# Patient Record
Sex: MALE | Race: BLACK OR AFRICAN AMERICAN | NOT HISPANIC OR LATINO | URBAN - METROPOLITAN AREA
[De-identification: names, ages, dates, MRNs, and addresses within clinical notes are randomized per-mention and may not be internally consistent; named-entity substitution may affect disease eponyms.]

---

## 2018-03-27 ENCOUNTER — INPATIENT (INPATIENT)
Facility: HOSPITAL | Age: 83
LOS: 4 days | Discharge: NURSING, REHAB OR LONG-TERM CARE | End: 2018-04-01
Attending: HOSPITALIST | Admitting: HOSPITALIST
Payer: MEDICARE

## 2018-03-27 VITALS
DIASTOLIC BLOOD PRESSURE: 81 MMHG | TEMPERATURE: 99 F | HEIGHT: 65 IN | WEIGHT: 119.93 LBS | OXYGEN SATURATION: 98 % | SYSTOLIC BLOOD PRESSURE: 121 MMHG | HEART RATE: 90 BPM | RESPIRATION RATE: 16 BRPM

## 2018-03-27 PROBLEM — Z00.00 ENCOUNTER FOR PREVENTIVE HEALTH EXAMINATION: Status: ACTIVE | Noted: 2018-03-27

## 2018-03-27 LAB
ALBUMIN SERPL ELPH-MCNC: 2.3 G/DL — LOW (ref 3.3–5)
ALP SERPL-CCNC: 163 U/L — HIGH (ref 40–120)
ALT FLD-CCNC: 22 U/L — SIGNIFICANT CHANGE UP (ref 12–78)
ANION GAP SERPL CALC-SCNC: 8 MMOL/L — SIGNIFICANT CHANGE UP (ref 5–17)
ANISOCYTOSIS BLD QL: SLIGHT — SIGNIFICANT CHANGE UP
APPEARANCE UR: ABNORMAL
APTT BLD: 34.5 SEC — SIGNIFICANT CHANGE UP (ref 27.5–37.4)
AST SERPL-CCNC: 28 U/L — SIGNIFICANT CHANGE UP (ref 15–37)
BACTERIA # UR AUTO: ABNORMAL
BASOPHILS # BLD AUTO: 0 K/UL — SIGNIFICANT CHANGE UP (ref 0–0.2)
BASOPHILS NFR BLD AUTO: 0 % — SIGNIFICANT CHANGE UP (ref 0–2)
BILIRUB SERPL-MCNC: 0.4 MG/DL — SIGNIFICANT CHANGE UP (ref 0.2–1.2)
BILIRUB UR-MCNC: NEGATIVE — SIGNIFICANT CHANGE UP
BUN SERPL-MCNC: 29 MG/DL — HIGH (ref 7–23)
CALCIUM SERPL-MCNC: 8.6 MG/DL — SIGNIFICANT CHANGE UP (ref 8.5–10.1)
CHLORIDE SERPL-SCNC: 104 MMOL/L — SIGNIFICANT CHANGE UP (ref 96–108)
CK MB BLD-MCNC: 1.5 % — SIGNIFICANT CHANGE UP (ref 0–3.5)
CK MB CFR SERPL CALC: 1.1 NG/ML — SIGNIFICANT CHANGE UP (ref 0.5–3.6)
CK SERPL-CCNC: 71 U/L — SIGNIFICANT CHANGE UP (ref 26–308)
CO2 SERPL-SCNC: 28 MMOL/L — SIGNIFICANT CHANGE UP (ref 22–31)
COLOR SPEC: YELLOW — SIGNIFICANT CHANGE UP
CREAT SERPL-MCNC: 0.85 MG/DL — SIGNIFICANT CHANGE UP (ref 0.5–1.3)
DIFF PNL FLD: ABNORMAL
EOSINOPHIL # BLD AUTO: 0 K/UL — SIGNIFICANT CHANGE UP (ref 0–0.5)
EOSINOPHIL NFR BLD AUTO: 0 % — SIGNIFICANT CHANGE UP (ref 0–6)
EPI CELLS # UR: SIGNIFICANT CHANGE UP
FLUAV SPEC QL CULT: NEGATIVE — SIGNIFICANT CHANGE UP
FLUBV AG SPEC QL IA: NEGATIVE — SIGNIFICANT CHANGE UP
GLUCOSE SERPL-MCNC: 156 MG/DL — HIGH (ref 70–99)
GLUCOSE UR QL: NEGATIVE MG/DL — SIGNIFICANT CHANGE UP
HCT VFR BLD CALC: 24.5 % — LOW (ref 39–50)
HGB BLD-MCNC: 7.9 G/DL — LOW (ref 13–17)
HYPOCHROMIA BLD QL: SIGNIFICANT CHANGE UP
INR BLD: 1.41 RATIO — HIGH (ref 0.88–1.16)
KETONES UR-MCNC: NEGATIVE — SIGNIFICANT CHANGE UP
LACTATE SERPL-SCNC: 1.2 MMOL/L — SIGNIFICANT CHANGE UP (ref 0.7–2)
LEUKOCYTE ESTERASE UR-ACNC: NEGATIVE — SIGNIFICANT CHANGE UP
LYMPHOCYTES # BLD AUTO: 1.3 K/UL — SIGNIFICANT CHANGE UP (ref 1–3.3)
LYMPHOCYTES # BLD AUTO: 25 % — SIGNIFICANT CHANGE UP (ref 13–44)
MANUAL SMEAR VERIFICATION: SIGNIFICANT CHANGE UP
MCHC RBC-ENTMCNC: 22.4 PG — LOW (ref 27–34)
MCHC RBC-ENTMCNC: 32.2 GM/DL — SIGNIFICANT CHANGE UP (ref 32–36)
MCV RBC AUTO: 69.4 FL — LOW (ref 80–100)
MICROCYTES BLD QL: SIGNIFICANT CHANGE UP
MONOCYTES # BLD AUTO: 0.05 K/UL — SIGNIFICANT CHANGE UP (ref 0–0.9)
MONOCYTES NFR BLD AUTO: 1 % — LOW (ref 2–14)
NEUTROPHILS # BLD AUTO: 3.83 K/UL — SIGNIFICANT CHANGE UP (ref 1.8–7.4)
NEUTROPHILS NFR BLD AUTO: 72 % — SIGNIFICANT CHANGE UP (ref 43–77)
NEUTS BAND # BLD: 2 % — SIGNIFICANT CHANGE UP (ref 0–8)
NITRITE UR-MCNC: NEGATIVE — SIGNIFICANT CHANGE UP
NRBC # BLD: 0 /100 — SIGNIFICANT CHANGE UP (ref 0–0)
NRBC # BLD: SIGNIFICANT CHANGE UP /100 WBCS (ref 0–0)
OB PNL STL: NEGATIVE — SIGNIFICANT CHANGE UP
PH UR: 5 — SIGNIFICANT CHANGE UP (ref 5–8)
PLAT MORPH BLD: NORMAL — SIGNIFICANT CHANGE UP
PLATELET # BLD AUTO: 335 K/UL — SIGNIFICANT CHANGE UP (ref 150–400)
POLYCHROMASIA BLD QL SMEAR: SLIGHT — SIGNIFICANT CHANGE UP
POTASSIUM SERPL-MCNC: 4 MMOL/L — SIGNIFICANT CHANGE UP (ref 3.5–5.3)
POTASSIUM SERPL-SCNC: 4 MMOL/L — SIGNIFICANT CHANGE UP (ref 3.5–5.3)
PROT SERPL-MCNC: 6.6 GM/DL — SIGNIFICANT CHANGE UP (ref 6–8.3)
PROT UR-MCNC: 100 MG/DL
PROTHROM AB SERPL-ACNC: 15.5 SEC — HIGH (ref 9.8–12.7)
RBC # BLD: 3.53 M/UL — LOW (ref 4.2–5.8)
RBC # FLD: 24.1 % — HIGH (ref 10.3–14.5)
RBC BLD AUTO: ABNORMAL
RBC CASTS # UR COMP ASSIST: ABNORMAL /HPF (ref 0–4)
SODIUM SERPL-SCNC: 140 MMOL/L — SIGNIFICANT CHANGE UP (ref 135–145)
SP GR SPEC: 1.01 — SIGNIFICANT CHANGE UP (ref 1.01–1.02)
TARGETS BLD QL SMEAR: SLIGHT — SIGNIFICANT CHANGE UP
TROPONIN I SERPL-MCNC: <.015 NG/ML — SIGNIFICANT CHANGE UP (ref 0.01–0.04)
UROBILINOGEN FLD QL: 1 MG/DL
WBC # BLD: 5.18 K/UL — SIGNIFICANT CHANGE UP (ref 3.8–10.5)
WBC # FLD AUTO: 5.18 K/UL — SIGNIFICANT CHANGE UP (ref 3.8–10.5)
WBC UR QL: SIGNIFICANT CHANGE UP

## 2018-03-27 PROCEDURE — 99285 EMERGENCY DEPT VISIT HI MDM: CPT

## 2018-03-27 PROCEDURE — 71045 X-RAY EXAM CHEST 1 VIEW: CPT | Mod: 26

## 2018-03-27 PROCEDURE — 70450 CT HEAD/BRAIN W/O DYE: CPT | Mod: 26

## 2018-03-27 NOTE — ED PROVIDER NOTE - OBJECTIVE STATEMENT
Pertinent PMH/PSH/FHx/SHx and Review of Systems contained within:    89yo M w PMH of metastatic prostate ca (pt not aware) currently not on treatment, no PSH presents to ED for eval of weakness & urinary incontinence.  Pt was well at baseline, until yesterday.  Pt unable to ambulate and has been having urinary incontinence.  Denies vomiting, fever, chills, CP, SOB, diarrhea.  Pt c/o mild lower abd pain.  No head trauma, LOC.  PMD- Frieda Martinez, instructed family to bring pt to ED.    No fever/chills, No photophobia/eye pain/changes in vision, No ear pain/sore throat/dysphagia, No chest pain/palpitations, no SOB/cough/wheeze/stridor, +abdominal pain, No neck/back pain, no rash

## 2018-03-27 NOTE — ED PROVIDER NOTE - PHYSICAL EXAMINATION
Gen: Alert, NAD, pleasant M speaking in complete sentneces  Head: NC, AT, EOMI, normal lids/conjunctiva  ENT: normal hearing, patent oropharynx, MMM  Neck: supple, no tenderness/meningismus, FROM  Pulm: Bilateral clear BS, normal resp effort, no wheeze/stridor/retractions  CV: RRR, no M/R/G, +dist pulses  Abd: soft, +mild lower abd TTP, ND, +BS, no guarding/rebound tenderness  Mskel: no edema/erythema/cyanosis  Skin: no rash  Neuro: no sensory/motor deficits Gen: Alert, NAD, pleasant M speaking in complete sentneces  Head: NC, AT, EOMI, normal lids/conjunctiva  ENT: normal hearing, patent oropharynx, MMM  Neck: supple, no tenderness/meningismus, FROM  Pulm: Bilateral clear BS, normal resp effort, no wheeze/stridor/retractions  CV: RRR, no M/R/G, +dist pulses  Abd: soft, +mild lower abd TTP, ND, +BS, no guarding/rebound tenderness  Mskel: no edema/erythema/cyanosis, +rectal tone  Skin: no rash  Neuro: no sensory/motor deficits

## 2018-03-27 NOTE — ED ADULT TRIAGE NOTE - CHIEF COMPLAINT QUOTE
general weakness increasing over the last two days   with urinary incontinence and inability to ambulate

## 2018-03-28 DIAGNOSIS — C61 MALIGNANT NEOPLASM OF PROSTATE: ICD-10-CM

## 2018-03-28 DIAGNOSIS — R26.2 DIFFICULTY IN WALKING, NOT ELSEWHERE CLASSIFIED: ICD-10-CM

## 2018-03-28 DIAGNOSIS — Z65.9 PROBLEM RELATED TO UNSPECIFIED PSYCHOSOCIAL CIRCUMSTANCES: ICD-10-CM

## 2018-03-28 DIAGNOSIS — Z29.9 ENCOUNTER FOR PROPHYLACTIC MEASURES, UNSPECIFIED: ICD-10-CM

## 2018-03-28 LAB
CULTURE RESULTS: SIGNIFICANT CHANGE UP
SPECIMEN SOURCE: SIGNIFICANT CHANGE UP

## 2018-03-28 PROCEDURE — 74177 CT ABD & PELVIS W/CONTRAST: CPT | Mod: 26

## 2018-03-28 PROCEDURE — 99497 ADVNCD CARE PLAN 30 MIN: CPT

## 2018-03-28 PROCEDURE — 99223 1ST HOSP IP/OBS HIGH 75: CPT

## 2018-03-28 RX ORDER — HEPARIN SODIUM 5000 [USP'U]/ML
5000 INJECTION INTRAVENOUS; SUBCUTANEOUS EVERY 8 HOURS
Qty: 0 | Refills: 0 | Status: DISCONTINUED | OUTPATIENT
Start: 2018-03-28 | End: 2018-04-01

## 2018-03-28 RX ORDER — ACETAMINOPHEN 500 MG
650 TABLET ORAL EVERY 6 HOURS
Qty: 0 | Refills: 0 | Status: DISCONTINUED | OUTPATIENT
Start: 2018-03-28 | End: 2018-04-01

## 2018-03-28 RX ORDER — MORPHINE SULFATE 50 MG/1
2 CAPSULE, EXTENDED RELEASE ORAL EVERY 6 HOURS
Qty: 0 | Refills: 0 | Status: DISCONTINUED | OUTPATIENT
Start: 2018-03-28 | End: 2018-04-01

## 2018-03-28 RX ORDER — OXYCODONE AND ACETAMINOPHEN 5; 325 MG/1; MG/1
1 TABLET ORAL EVERY 4 HOURS
Qty: 0 | Refills: 0 | Status: DISCONTINUED | OUTPATIENT
Start: 2018-03-28 | End: 2018-04-01

## 2018-03-28 RX ORDER — SODIUM CHLORIDE 9 MG/ML
1000 INJECTION INTRAMUSCULAR; INTRAVENOUS; SUBCUTANEOUS
Qty: 0 | Refills: 0 | Status: DISCONTINUED | OUTPATIENT
Start: 2018-03-28 | End: 2018-04-01

## 2018-03-28 RX ORDER — AMLODIPINE BESYLATE 2.5 MG/1
5 TABLET ORAL DAILY
Qty: 0 | Refills: 0 | Status: DISCONTINUED | OUTPATIENT
Start: 2018-03-28 | End: 2018-04-01

## 2018-03-28 RX ADMIN — OXYCODONE AND ACETAMINOPHEN 1 TABLET(S): 5; 325 TABLET ORAL at 12:02

## 2018-03-28 RX ADMIN — OXYCODONE AND ACETAMINOPHEN 1 TABLET(S): 5; 325 TABLET ORAL at 13:45

## 2018-03-28 RX ADMIN — SODIUM CHLORIDE 100 MILLILITER(S): 9 INJECTION INTRAMUSCULAR; INTRAVENOUS; SUBCUTANEOUS at 22:56

## 2018-03-28 RX ADMIN — HEPARIN SODIUM 5000 UNIT(S): 5000 INJECTION INTRAVENOUS; SUBCUTANEOUS at 22:54

## 2018-03-28 RX ADMIN — AMLODIPINE BESYLATE 5 MILLIGRAM(S): 2.5 TABLET ORAL at 18:48

## 2018-03-28 RX ADMIN — HEPARIN SODIUM 5000 UNIT(S): 5000 INJECTION INTRAVENOUS; SUBCUTANEOUS at 05:09

## 2018-03-28 RX ADMIN — SODIUM CHLORIDE 100 MILLILITER(S): 9 INJECTION INTRAMUSCULAR; INTRAVENOUS; SUBCUTANEOUS at 05:09

## 2018-03-28 RX ADMIN — HEPARIN SODIUM 5000 UNIT(S): 5000 INJECTION INTRAVENOUS; SUBCUTANEOUS at 13:42

## 2018-03-28 NOTE — PHYSICAL THERAPY INITIAL EVALUATION ADULT - CRITERIA FOR SKILLED THERAPEUTIC INTERVENTIONS
therapy frequency/impairments found/functional limitations in following categories/risk reduction/prevention/rehab potential

## 2018-03-28 NOTE — H&P ADULT - NSHPPHYSICALEXAM_GEN_ALL_CORE
GENERAL: NAD, well-groomed, cachectic  HEAD:  Atraumatic, Normocephalic  EYES: EOMI, PERRLA, conjunctiva and sclera clear  ENMT: No tonsillar erythema, exudates, or enlargement; Moist mucous membranes, No lesions  NECK: Supple, No JVD, Normal thyroid  NERVOUS SYSTEM:  Alert & Oriented X3, Good concentration  CHEST/LUNG: Clear to ascultation bilaterally; No rales, rhonchi, wheezing, or rubs  HEART: Regular rate and rhythm; No murmurs, rubs, or gallops  ABDOMEN: Soft, Nontender, Nondistended; Bowel sounds present  EXTREMITIES: No clubbing, cyanosis, or edema  SKIN: no rashes or lesions  PSYCH: normal affect and behavior

## 2018-03-28 NOTE — PHYSICAL THERAPY INITIAL EVALUATION ADULT - MODIFIED CLINICAL TEST OF SENSORY INTEGRATION IN BALANCE TEST
Barthel Index: Feeding Score-5   Bathing Score-0   Grooming Score-5   Dressing Score-5   Bowels Score-5   Bladder Score-5   Toilet Score- 0  Transfers Score-5   Mobility Score-0   Stairs Score-0     Total Score-  30/100

## 2018-03-28 NOTE — H&P ADULT - HISTORY OF PRESENT ILLNESS
89 year old man with PMH metastatic prostate ca (*patient unaware of diagnosis-as per ED attending, family was present in ED earlier, stated they did not tell patient and don't want him to know*) presents with weakness, decreased ambulation, and increased urinary incontinence.  Pt denies Back pain, numbness, fecal incontinence.  He is AAO x 3 and states that he makes medical decisions for himself.    In the ED, imaging consistent with metastatic prostate Ca.

## 2018-03-28 NOTE — CHART NOTE - NSCHARTNOTEFT_GEN_A_CORE
89 y.o with metastatic prostate Ca presents with FTT, urinary incontinence and pain. Pt is aware that he has prostate CA but not of the metastasis and as per notes the family did not want to tell him. Palliative team is to set up a family meeting as pt should be aware of his poor prognosis. Pt is AA0x3. Unsure if he has an active urologist and has been offered chemical castration. will try to contact family.

## 2018-03-28 NOTE — PHYSICAL THERAPY INITIAL EVALUATION ADULT - TINETTI BALANCE TEST, REHAB EVAL
Sitting Balance - 0  Rises From Chair - 0 Attempts to rise - 0  Immediate Standing Balance - 0  Standing Balance -0   Nudged - 0  Eyes Closed -0   Turning 360 Deg -0   Sitting down -1   Balance Score - 1/16

## 2018-03-28 NOTE — GOALS OF CARE CONVERSATION - PERSONAL ADVANCE DIRECTIVE - TREATMENT GUIDELINE COMMENT
Met with Dr. Tyler aGrcia (son) and discussed goals of care and the options, comfort measures, and Hospice care also about code status. The son stated that his brother from [place] and sister will be coming in tomorrow and will make the decision on his care after discussing with their primary attending Dr. Patel.

## 2018-03-28 NOTE — H&P ADULT - PROBLEM SELECTOR PLAN 1
Pain control   Tylenol/Percocet/Morphine PRN  Not on treatment  Palliative care consult  *pt unaware of diagnosis

## 2018-03-28 NOTE — PHYSICAL THERAPY INITIAL EVALUATION ADULT - PLANNED THERAPY INTERVENTIONS, PT EVAL
balance training/bed mobility training/gait training/strengthening/neuromuscular re-education/transfer training

## 2018-03-28 NOTE — GOALS OF CARE CONVERSATION - PERSONAL ADVANCE DIRECTIVE - CONVERSATION DETAILS
89 year old man with PMH metastatic prostate ca (*patient unaware of diagnosis-as per ED attending, family was present in ED earlier, stated they did not tell patient and don't want him to know*) presents with weakness, decreased ambulation, and increased urinary incontinence.  Pt denies Back pain, numbness, fecal incontinence.  He is AAO x 3 and states that he makes medical decisions for himself. In the ED, imaging consistent with metastatic prostate Ca.    Met and  spoke to patient and discussed goals of care and advance care planning.  Also spoke to son Tyler Garcia and he stated that he will be coming in this evening and will discus in detail. Met with Dr. Tyler Garcia (son) and discussed goals of care and the options, comfort measures, and Hospice care also about code status. The son stated that his brother from [place] and sister will be coming in tomorrow and will make the decision on his care after discussing with their primary attending Dr. Patel.

## 2018-03-28 NOTE — H&P ADULT - PROBLEM SELECTOR PLAN 2
Tried to reach family regarding patient's not knowing his diagnosis, left message at [phone].  Discussed with patient, stated that he had no one who makes medical decisions for him and that he would like to be notified of his medical issues.  He is AAO x 3 and ethically should be made aware of his prostate ca especially given that he will likely require palliative care/Hospice care.  Cannot reach family at this time.  I would like the opportunity to discuss his case with them before informing the patient of his diagnosis and encourage them to notify him/allow us to notify him of his diagnosis.  It is ethically sound to inform him and he will logistically need to know if Hospice is involved.  Will endorse to day team if family cannot be reached tonight.    Would involve ethics committee if family still refuses to inform patient of his diagnosis as he has stated he wants to be informed of his medical care.

## 2018-03-28 NOTE — GOALS OF CARE CONVERSATION - PERSONAL ADVANCE DIRECTIVE - WHAT MATTERS MOST
Met with Dr. Tyler Garcia (son) and discussed goals of care and the options, comfort measures, and Hospice care also about code status. The son stated that his brother from [place] and sister will be coming in tomorrow and will make the decision on his care after discussing with their primary attending Dr. Patel.

## 2018-03-28 NOTE — PHYSICAL THERAPY INITIAL EVALUATION ADULT - PERTINENT HX OF CURRENT PROBLEM, REHAB EVAL
Pt admitted to [facility] secondary to weakness, declining ability to ambulate, and urinary incontinence

## 2018-03-28 NOTE — H&P ADULT - NSHPLABSRESULTS_GEN_ALL_CORE
Vital Signs Last 24 Hrs  T(C): 37.7 (27 Mar 2018 23:30), Max: 37.7 (27 Mar 2018 20:25)  T(F): 99.9 (27 Mar 2018 23:30), Max: 99.9 (27 Mar 2018 23:30)  HR: 87 (27 Mar 2018 23:30) (87 - 90)  BP: 155/83 (27 Mar 2018 23:30) (121/81 - 155/83)  BP(mean): --  RR: 22 (27 Mar 2018 23:30) (16 - 22)  SpO2: 99% (27 Mar 2018 23:30) (95% - 99%)        LABS:                        7.9    5.18  )-----------( 335      ( 27 Mar 2018 19:59 )             24.5         140  |  104  |  29<H>  ----------------------------<  156<H>  4.0   |  28  |  0.85    Ca    8.6      27 Mar 2018 19:59    TPro  6.6  /  Alb  2.3<L>  /  TBili  0.4  /  DBili  x   /  AST  28  /  ALT  22  /  AlkPhos  163<H>  0327    PT/INR - ( 27 Mar 2018 19:59 )   PT: 15.5 sec;   INR: 1.41 ratio         PTT - ( 27 Mar 2018 19:59 )  PTT:34.5 sec  Urinalysis Basic - ( 27 Mar 2018 20:26 )    Color: Yellow / Appearance: Slightly Turbid / S.015 / pH: x  Gluc: x / Ketone: Negative  / Bili: Negative / Urobili: 1 mg/dL   Blood: x / Protein: 100 mg/dL / Nitrite: Negative   Leuk Esterase: Negative / RBC: 3-5 /HPF / WBC 0-2   Sq Epi: x / Non Sq Epi: Few / Bacteria: Moderate        RADIOLOGY & ADDITIONAL STUDIES:    CT ab/plv:  IMPRESSION:   Limited study as above. No gross evidence of bowel obstruction, free air   or bowel wall thickening. Please correlate clinically for constipation.  Osseous metastatic disease,? Prostate primary.  Small bilateral pleural effusions and lower lobe compressive atelectasis.   Mild cardiomegaly.

## 2018-03-28 NOTE — PROGRESS NOTE ADULT - SUBJECTIVE AND OBJECTIVE BOX
PALLIATIVE CARE CONSULTATION NOTE            Requested by Name:  Date/ Time:  Reason for referral/ Consultation:    HPI:  89 year old man with PMH metastatic prostate ca (*patient unaware of diagnosis-as per ED attending, family was present in ED earlier, stated they did not tell patient and don't want him to know*) presents with weakness, decreased ambulation, and increased urinary incontinence.  Pt denies Back pain, numbness, fecal incontinence.  He is AAO x 3 and states that he makes medical decisions for himself.    In the ED, imaging consistent with metastatic prostate Ca. (28 Mar 2018 02:30)      PAST MEDICAL & SURGICAL HISTORY:  Prostate cancer metastatic to multiple sites  No significant past surgical history      SOCIAL HISTORY: Admitted from: home [ ] SNF [ ] HECTOR [ ] AL [ ]                                                                smoker [ ] past smoker [ ] non smoker[ ]                                                              no alcohol [ ] social alcohol [ ] alcohol [ ]  Surrogate/ HCP/ Guardian: [ ] YES [ ] NO.     NAME / PHONE #:    Significant other/partner:                     Children:                         Rastafari/Spirituality:    FAMILY HISTORY:  No pertinent family history in first degree relatives      Baseline ADLs (Prior to admission)  Independent:  Moderately [ ] fully [ ]   Dependent: moderately [ ] fully [ ]    ADVANCE DIRECTIVES:  [ ] YES [ ] NO   DNR: YES [ ] NO [  ]  Completed on:                     MOLST: YES [ ] NO [  ]  Completed on:  Living Will: YES [ ] NO [  ]  Completed on:    Allergies    No Known Allergies    Intolerances      MEDICATIONS  (STANDING):  heparin  Injectable 5000 Unit(s) SubCutaneous every 8 hours  sodium chloride 0.9%. 1000 milliLiter(s) (100 mL/Hr) IV Continuous <Continuous>    MEDICATIONS  (PRN):  acetaminophen   Tablet. 650 milliGRAM(s) Oral every 6 hours PRN Mild Pain (1 - 3)  morphine  - Injectable 2 milliGRAM(s) IV Push every 6 hours PRN Severe Pain (7 - 10)  oxyCODONE    5 mG/acetaminophen 325 mG 1 Tablet(s) Oral every 4 hours PRN Moderate Pain (4 - 6)    OPIATE NAIVE: (Y/N)  I STOP REVIEWED: (Y/N) : (#-------------------)     REVIEW OF SYSTEM:     PAIN : (Y/N) (0-10)  PAIN SITE :                           QUALITY/QUANTITY OF PAIN :             PAIN RADIATING :( Y/N) SEVERITY :            FREQUENCY :  IMPACT ON ADLs :      DYSPNEA: Yes [  ] No [  ] Mild [ ] Moderate [ ] Severe [ ]  NAUSEA/VOMITING: Yes [  ] No [  ]  EXCESSIVE SECRETIONS: YES [  ] NO [  ]  DEPRESSION: Yes [  ] No [  ] Mild [ ]Moderate [ ] Severe [ ]  ANXIETY: Yes [  ] No [  ]  AGITATION: Yes [  ] No [  ]  CONSTIPATION : Yes [  ] No [  ]  DIARRHEA : Yes [  ] No [  ]  FRAILTY SYNDROME: Yes [  ] No [  ]  FAILURE TO THRIVE: Yes [  ] No [  ]  DIBILITY: Yes [  ] No [  ]  OTHER SYMPTOMS :  UNABLE TO OBTAIN DUE TO POOR MENTATION [  ]    PHYSICAL EXAM:  T(F): 99.5 (03-28-18 @ 05:03), Max: 99.9 (03-27-18 @ 23:30)  HR: 82 (03-28-18 @ 09:27) (82 - 90)  BP: 159/92 (03-28-18 @ 09:27) (121/81 - 161/93)  RR: 16 (03-28-18 @ 09:27) (16 - 26)  SpO2: 93% (03-28-18 @ 09:27) (93% - 99%)  Wt(kg): --   WEIGHT :                      BMI:  I&O's Summary    27 Mar 2018 07:01  -  28 Mar 2018 07:00  --------------------------------------------------------  IN: 200 mL / OUT: 100 mL / NET: 100 mL      CAPILLARY BLOOD GLUCOSE          GENERAL: alert: Yes [ ] No [  ]oriented x ______cofused [ ] lethargic [ ] agitated [ ] cachexia [ ] nonverbal [ ] coma [ ]  HEENT: normal [ ] dry mouth [ ] excessive secretions [ ] Bipap [  ] ET tube/trach [ ] Vent [  ]  LUNGS: Comfortable [ ] tachypnea/ labored breathing[ ]   CV :  normal [ ] tachycardia [ ]bradycardia [  ]   GI : normal [ ] distended [ ] tender [ ] no BS [ ]  PEG /NG tube [ ]   : normal [ ] incontinent [ ] oliguria/anuria [ ] chirinos [ ]  MSK : normal [ ] weakness [ ] edema [ ] ambulatory [ ] bedbound/ wheelchair bound [ ]   SKIN : normal [ ] pressure ulcer: Yes [  ] No [  ] Stage ______________ rash: Yes [  ] No [  ]    FUNCTIONAL ASSESSMENT:   Karnofsky Performance Score :  Palliative Performance Status Version 2:         %    LABS:                        7.9    5.18  )-----------( 335      ( 27 Mar 2018 19:59 )             24.5     03-27    140  |  104  |  29<H>  ----------------------------<  156<H>  4.0   |  28  |  0.85    Ca    8.6      27 Mar 2018 19:59    TPro  6.6  /  Alb  2.3<L>  /  TBili  0.4  /  DBili  x   /  AST  28  /  ALT  22  /  AlkPhos  163<H>  03-27    PT/INR - ( 27 Mar 2018 19:59 )   PT: 15.5 sec;   INR: 1.41 ratio         PTT - ( 27 Mar 2018 19:59 )  PTT:34.5 sec  CARDIAC MARKERS ( 27 Mar 2018 19:59 )  <.015 ng/mL / x     / 71 U/L / x     / 1.1 ng/mL      I&O's Detail    27 Mar 2018 07:01  -  28 Mar 2018 07:00  --------------------------------------------------------  IN:    sodium chloride 0.9%.: 200 mL  Total IN: 200 mL    OUT:    Voided: 100 mL  Total OUT: 100 mL    Total NET: 100 mL          ASSESSMENT:   89y Male admitted with WEAKNESS / UNABLE TO AMBULATE  GENERAL WEAKNESS      PROBLEM LIST :  PROBLEM/RECOMMENDATION:   1) PROBLEM  : Goals of care, counseling/ discussion.  RECOMMENDATION : Meet with patient/ family and discussed GOC & Advanced care planning.                                     Palliative care information /counseling provided.                                       Advanced Directives addressed     PROBLEM/ RECOMMENDATION:  2)PROBLEM :Resuscitation/ DNR/ DNI,   RECOMMENDATION: DNR/ DNI    PROBLEM/ RECOMMENDATION :  3)PROBLEM : Medical order for life sustaining treatment  RECOMMENDATION : MOLST Form ( initiated/ completed)    PROBLEM/RECOMMENDATION :  4)PROBLEM: Advanced care planning  RECOMMENDATION : Family meeting on: Met and  spoke to patient and discussed goals of care and advance care planning.  Also spoke to son Tyler Garcia and he stated that he will be coming in this evening and will discus in detail.  PLAN:  REFFERALS:  HOSPICE: YES [  ] NO [  ]                         PALLIATIVE /MEDICAL SOCIAL WORKER AND : YES [  ] NO [  ]                         : YES [  ] NO [  ]                         SPEECH/ SWALLOW: YES [  ] NO [  ]                         PSYHCH CONSULT: YES [ ] NO [ ]                          PAIN MANAGEMENT: YES [  ] NO [  ]                         NUTRITION: YES [  ] NO [  ]                         ETHICS: YES [  ] NO [  ]                         PT/OT: YES [  ] NO [  ]  RECOMENDATIONS:   CONSIDER COMFORT CARE.  DNR/ DNI.  HOSPICE CARE.  SYMPTOM MANAGEMENT WITH HOSPICE CARE.  PAIN MANAGEMENT. PALLIATIVE CARE CONSULTATION NOTE            Requested by Name: Dr. Oswald  Date/ Time: 3/28/18  Reason for referral/ Consultation: Goals of care conversation.  HPI:  89 year old man with PMH metastatic prostate ca (*patient unaware of diagnosis-as per ED attending, family was present in ED earlier, stated they did not tell patient and don't want him to know*) presents with weakness, decreased ambulation, and increased urinary incontinence.  Pt denies Back pain, numbness, fecal incontinence.  He is AAO x 3 and states that he makes medical decisions for himself. In the ED, imaging consistent with metastatic prostate Ca.  PAST MEDICAL & SURGICAL HISTORY:  Prostate cancer metastatic to multiple sites  No significant past surgical history    SOCIAL HISTORY: Admitted from: home [x ] SNF [ ] HECTOR [ ] AL [ ]                                                                smoker [ ] past smoker [ ] non smoker[ ]                                                              no alcohol [ ] social alcohol [ ] alcohol [ ]  Surrogate/ HCP/ Guardian: [ ] YES [ ] NO.     NAME / PHONE #:  Tyler Garcia (son) [phone]    Significant other/partner:                     Children:                         Sikh/Spirituality:    FAMILY HISTORY:  No pertinent family history in first degree relatives  Baseline ADLs (Prior to admission)  Independent:  Moderately [ ] fully [ ]   Dependent: moderately [x ] fully [ ]    ADVANCE DIRECTIVES:  [ ] YES [x ] NO   DNR: YES [ ] NO [x  ]  Completed on:                     MOLST: YES [ ] NO [x  ]  Completed on:  Living Will: YES [ ] NO [  ]  Completed on:    Allergies  No Known Allergies  Intolerances    MEDICATIONS  (STANDING):  heparin  Injectable 5000 Unit(s) SubCutaneous every 8 hours  sodium chloride 0.9%. 1000 milliLiter(s) (100 mL/Hr) IV Continuous <Continuous>    MEDICATIONS  (PRN):  acetaminophen   Tablet. 650 milliGRAM(s) Oral every 6 hours PRN Mild Pain (1 - 3)  morphine  - Injectable 2 milliGRAM(s) IV Push every 6 hours PRN Severe Pain (7 - 10)  oxyCODONE    5 mG/acetaminophen 325 mG 1 Tablet(s) Oral every 4 hours PRN Moderate Pain (4 - 6)    OPIATE NAIVE: (Y/N)  I STOP REVIEWED: (Y/N) : (#-------------------)     REVIEW OF SYSTEM:     PAIN : (Y/N) (0-10)  PAIN SITE :   hip and shoulders     QUALITY/QUANTITY OF PAIN :             PAIN RADIATING :( Y/N) SEVERITY :            FREQUENCY :  IMPACT ON ADLs :    total care  DYSPNEA: Yes [x  ] No [  ] Mild [ ] Moderate [x ] Severe [ ]  NAUSEA/VOMITING: Yes [  ] No [ x ]  EXCESSIVE SECRETIONS: YES [  ] NO [ x ]  DEPRESSION: Yes [ x ] No [  ] Mild [x ]Moderate [ ] Severe [ ]  ANXIETY: Yes [  ] No [ x ]  AGITATION: Yes [  ] No [x  ]  CONSTIPATION : Yes [  ] No [  x]  DIARRHEA : Yes [  ] No [x  ]  FRAILTY SYNDROME: Yes [ x ] No [  ]  FAILURE TO THRIVE: Yes [ x ] No [  ]  DEBILITY Yes [ x ] No [  ]  OTHER SYMPTOMS :  UNABLE TO OBTAIN DUE TO POOR MENTATION [  ]    PHYSICAL EXAM:  T(F): 99.5 (03-28-18 @ 05:03), Max: 99.9 (03-27-18 @ 23:30)  HR: 82 (03-28-18 @ 09:27) (82 - 90)  BP: 159/92 (03-28-18 @ 09:27) (121/81 - 161/93)  RR: 16 (03-28-18 @ 09:27) (16 - 26)  SpO2: 93% (03-28-18 @ 09:27) (93% - 99%)  Wt(kg): --   WEIGHT :                      BMI:  I&O's Summary    27 Mar 2018 07:01  -  28 Mar 2018 07:00  --------------------------------------------------------  IN: 200 mL / OUT: 100 mL / NET: 100 mL    CAPILLARY BLOOD GLUCOSE  GENERAL: alert: Yes [x ] No [  ]oriented x 3______confused [ ] lethargic [ x] agitated [ ] cachexia [ ] nonverbal [ ] coma [ ]  HEENT: normal [ x] dry mouth [ ] excessive secretions [ ] BiPAP [  ] ET tube/trach [ ] Vent [  ]  LUNGS: Comfortable [ ] tachypnea/ labored breathing[x ]   CV :  normal [x ] tachycardia [ ]bradycardia [  ]   GI : normal [x ] distended [ ] tender [ ] no BS [ ]  PEG /NG tube [ ]   : normal [x ] incontinent [ ] oliguria/anuria [ ] Du [ ]  MSK : normal [ ] weakness [x ] edema [ ] ambulatory [ ] bedbound/ wheelchair bound [x ]   SKIN : normal [x ] pressure ulcer: Yes [  ] No [  ] Stage ______________ rash: Yes [  ] No [  ]    FUNCTIONAL ASSESSMENT:   Karnofsky Performance Score : <30  Palliative Performance Status Version 2:         %    LABS:                        7.9    5.18  )-----------( 335      ( 27 Mar 2018 19:59 )             24.5     03-27    140  |  104  |  29<H>  ----------------------------<  156<H>  4.0   |  28  |  0.85    Ca    8.6      27 Mar 2018 19:59    TPro  6.6  /  Alb  2.3<L>  /  TBili  0.4  /  DBili  x   /  AST  28  /  ALT  22  /  AlkPhos  163<H>  03-27    PT/INR - ( 27 Mar 2018 19:59 )   PT: 15.5 sec;   INR: 1.41 ratio       PTT - ( 27 Mar 2018 19:59 )  PTT:34.5 sec  CARDIAC MARKERS ( 27 Mar 2018 19:59 )  <.015 ng/mL / x     / 71 U/L / x     / 1.1 ng/mL    I&O's Detail    27 Mar 2018 07:01  -  28 Mar 2018 07:00  --------------------------------------------------------  IN:    sodium chloride 0.9%.: 200 mL  Total IN: 200 mL    OUT:    Voided: 100 mL  Total OUT: 100 mL    Total NET: 100 mL  ASSESSMENT:   89y Male admitted with WEAKNESS / UNABLE TO AMBULATE  GENERAL WEAKNESS  PROBLEM LIST :  PROBLEM/RECOMMENDATION:   1) PROBLEM  : Goals of care, counseling/ discussion.  RECOMMENDATION : Meet with patient/ family and discussed GOC & Advanced care planning.                                     Palliative care information /counseling provided.                                       Advanced Directives addressed     PROBLEM/ RECOMMENDATION:  2)PROBLEM :Resuscitation/ DNR/ DNI,   RECOMMENDATION: DNR/ DNI    PROBLEM/ RECOMMENDATION :  3)PROBLEM : Medical order for life sustaining treatment  RECOMMENDATION : MOLST Form explained.    PROBLEM/RECOMMENDATION :  4)PROBLEM: Advanced care planning  RECOMMENDATION : Family meeting on: Met and  spoke to patient and discussed goals of care and advance care planning.  Also spoke to son Tyler Garcia and he stated that he will be coming in this evening and will discus in detail. Met with Dr. Tyler Garcia (son) and discussed goals of care and the options, comfort measures, and Hospice care also about code status. The son stated that his brother from [place] and sister will be coming in tomorrow and will make the decision on his care after discussing with their primary attending Dr. Patel.     PLAN:  REFERRALS  HOSPICE: YES [  ] NO [x  ]                         PALLIATIVE /MEDICAL SOCIAL WORKER AND : YES [x  ] NO [  ]                         : YES [  ] NO [  ]                         SPEECH/ SWALLOW: YES [  ] NO [  ]                         PSYHCH CONSULT: YES [ ] NO [ ]                          PAIN MANAGEMENT: YES [  ] NO [  ]                         NUTRITION: YES [  ] NO [  ]                         ETHICS: YES [  ] NO [  ]                         PT/OT: YES [  ] NO [  ]  RECOMMENDATIONS   CONSIDER DNR/ DNI, COMFORT CARE.  HOSPICE CARE.  SYMPTOM MANAGEMENT WITH HOSPICE CARE.  PAIN MANAGEMENT.

## 2018-03-29 DIAGNOSIS — I10 ESSENTIAL (PRIMARY) HYPERTENSION: ICD-10-CM

## 2018-03-29 LAB
ANION GAP SERPL CALC-SCNC: 7 MMOL/L — SIGNIFICANT CHANGE UP (ref 5–17)
BUN SERPL-MCNC: 22 MG/DL — SIGNIFICANT CHANGE UP (ref 7–23)
CALCIUM SERPL-MCNC: 8.6 MG/DL — SIGNIFICANT CHANGE UP (ref 8.5–10.1)
CHLORIDE SERPL-SCNC: 106 MMOL/L — SIGNIFICANT CHANGE UP (ref 96–108)
CO2 SERPL-SCNC: 27 MMOL/L — SIGNIFICANT CHANGE UP (ref 22–31)
CREAT SERPL-MCNC: 0.64 MG/DL — SIGNIFICANT CHANGE UP (ref 0.5–1.3)
GLUCOSE SERPL-MCNC: 107 MG/DL — HIGH (ref 70–99)
HCT VFR BLD CALC: 25.5 % — LOW (ref 39–50)
HGB BLD-MCNC: 8.3 G/DL — LOW (ref 13–17)
MCHC RBC-ENTMCNC: 22.7 PG — LOW (ref 27–34)
MCHC RBC-ENTMCNC: 32.5 GM/DL — SIGNIFICANT CHANGE UP (ref 32–36)
MCV RBC AUTO: 69.9 FL — LOW (ref 80–100)
NRBC # BLD: 0 /100 WBCS — SIGNIFICANT CHANGE UP (ref 0–0)
PLATELET # BLD AUTO: 440 K/UL — HIGH (ref 150–400)
POTASSIUM SERPL-MCNC: 3.6 MMOL/L — SIGNIFICANT CHANGE UP (ref 3.5–5.3)
POTASSIUM SERPL-SCNC: 3.6 MMOL/L — SIGNIFICANT CHANGE UP (ref 3.5–5.3)
RBC # BLD: 3.65 M/UL — LOW (ref 4.2–5.8)
RBC # FLD: 23.9 % — HIGH (ref 10.3–14.5)
SODIUM SERPL-SCNC: 140 MMOL/L — SIGNIFICANT CHANGE UP (ref 135–145)
WBC # BLD: 5 K/UL — SIGNIFICANT CHANGE UP (ref 3.8–10.5)
WBC # FLD AUTO: 5 K/UL — SIGNIFICANT CHANGE UP (ref 3.8–10.5)

## 2018-03-29 PROCEDURE — 78306 BONE IMAGING WHOLE BODY: CPT | Mod: 26

## 2018-03-29 PROCEDURE — 99233 SBSQ HOSP IP/OBS HIGH 50: CPT

## 2018-03-29 RX ORDER — HYDRALAZINE HCL 50 MG
5 TABLET ORAL ONCE
Qty: 0 | Refills: 0 | Status: COMPLETED | OUTPATIENT
Start: 2018-03-29 | End: 2018-03-29

## 2018-03-29 RX ADMIN — HEPARIN SODIUM 5000 UNIT(S): 5000 INJECTION INTRAVENOUS; SUBCUTANEOUS at 13:05

## 2018-03-29 RX ADMIN — AMLODIPINE BESYLATE 5 MILLIGRAM(S): 2.5 TABLET ORAL at 05:34

## 2018-03-29 RX ADMIN — Medication 650 MILLIGRAM(S): at 04:15

## 2018-03-29 RX ADMIN — Medication 5 MILLIGRAM(S): at 04:16

## 2018-03-29 RX ADMIN — Medication 650 MILLIGRAM(S): at 05:15

## 2018-03-29 RX ADMIN — HEPARIN SODIUM 5000 UNIT(S): 5000 INJECTION INTRAVENOUS; SUBCUTANEOUS at 05:34

## 2018-03-29 NOTE — CONSULT NOTE ADULT - SUBJECTIVE AND OBJECTIVE BOX
HPI: 89 yo gentleman recently seen by Dr. Del Toro in clinic with markedly elevated PSA to 67.9. Now presenting to the hospital with failure to thrive.

## 2018-03-29 NOTE — PROGRESS NOTE ADULT - SUBJECTIVE AND OBJECTIVE BOX
Patient is a 89y old  Male who presents with a chief complaint of weakness, failure to thrive (28 Mar 2018 02:30)      INTERVAL HPI/OVERNIGHT EVENTS: no acute events. tolerating po. PT and urology to see the pt     MEDICATIONS  (STANDING):  amLODIPine   Tablet 5 milliGRAM(s) Oral daily  heparin  Injectable 5000 Unit(s) SubCutaneous every 8 hours  sodium chloride 0.9%. 1000 milliLiter(s) (100 mL/Hr) IV Continuous <Continuous>    MEDICATIONS  (PRN):  acetaminophen   Tablet. 650 milliGRAM(s) Oral every 6 hours PRN Mild Pain (1 - 3)  morphine  - Injectable 2 milliGRAM(s) IV Push every 6 hours PRN Severe Pain (7 - 10)  oxyCODONE    5 mG/acetaminophen 325 mG 1 Tablet(s) Oral every 4 hours PRN Moderate Pain (4 - 6)      Allergies    No Known Allergies    Intolerances        REVIEW OF SYSTEMS:  CONSTITUTIONAL: No fever, weight loss, or fatigue  EYES: No eye pain, visual disturbances, or discharge  ENMT:  No difficulty hearing, tinnitus, vertigo; No sinus or throat pain  NECK: No pain or stiffness  RESPIRATORY: No cough, wheezing, chills or hemoptysis; No shortness of breath  CARDIOVASCULAR: No chest pain, palpitations, dizziness, or leg swelling  GASTROINTESTINAL: No abdominal or epigastric pain. No nausea, vomiting, or hematemesis; No diarrhea or constipation. No melena or hematochezia.  GENITOURINARY: No dysuria, frequency, hematuria, or incontinence  NEUROLOGICAL: weakness  SKIN: No itching, burning, rashes, or lesions   LYMPH NODES: No enlarged glands  ENDOCRINE: No heat or cold intolerance; No hair loss  MUSCULOSKELETAL: No joint pain or swelling; No muscle, back, or extremity pain  PSYCHIATRIC: No depression, anxiety, mood swings, or difficulty sleeping  HEME/LYMPH: No easy bruising, or bleeding gums  ALLERGY AND IMMUNOLOGIC: No hives or eczema    Vital Signs Last 24 Hrs  T(C): 36.7 (29 Mar 2018 11:29), Max: 36.9 (28 Mar 2018 17:07)  T(F): 98 (29 Mar 2018 11:29), Max: 98.5 (28 Mar 2018 17:07)  HR: 93 (29 Mar 2018 11:29) (80 - 94)  BP: 136/77 (29 Mar 2018 11:29) (136/77 - 178/109)  BP(mean): --  RR: 18 (29 Mar 2018 11:29) (15 - 18)  SpO2: 98% (29 Mar 2018 11:29) (96% - 99%)    PHYSICAL EXAM:  GENERAL: NAD, well-groomed, well-developed  HEAD:  Atraumatic, Normocephalic  EYES: EOMI, PERRLA, conjunctiva and sclera clear  ENMT: No tonsillar erythema, exudates, or enlargement; Moist mucous membranes, Good dentition, No lesions  NECK: Supple, No JVD, Normal thyroid  NERVOUS SYSTEM:  Alert & Oriented X3, Good concentration; Motor Strength 5/5 B/L upper and 4/5 lower extremities; DTRs 2+ intact and symmetric  CHEST/LUNG: Clear to percussion bilaterally; No rales, rhonchi, wheezing, or rubs  HEART: slightly irregular; No murmurs, rubs, or gallops  ABDOMEN: Soft, Nontender, Nondistended; Bowel sounds present  EXTREMITIES:  2+ Peripheral Pulses, No clubbing, cyanosis, or edema  LYMPH: No lymphadenopathy noted  SKIN: No rashes or lesions    LABS:                        8.3    5.00  )-----------( 440      ( 29 Mar 2018 08:43 )             25.5     03-29    140  |  106  |  22  ----------------------------<  107<H>  3.6   |  27  |  0.64    Ca    8.6      29 Mar 2018 08:43    TPro  6.6  /  Alb  2.3<L>  /  TBili  0.4  /  DBili  x   /  AST  28  /  ALT  22  /  AlkPhos  163<H>  03-27    PT/INR - ( 27 Mar 2018 19:59 )   PT: 15.5 sec;   INR: 1.41 ratio         PTT - ( 27 Mar 2018 19:59 )  PTT:34.5 sec  Urinalysis Basic - ( 27 Mar 2018 20:26 )    Color: Yellow / Appearance: Slightly Turbid / S.015 / pH: x  Gluc: x / Ketone: Negative  / Bili: Negative / Urobili: 1 mg/dL   Blood: x / Protein: 100 mg/dL / Nitrite: Negative   Leuk Esterase: Negative / RBC: 3-5 /HPF / WBC 0-2   Sq Epi: x / Non Sq Epi: Few / Bacteria: Moderate      CAPILLARY BLOOD GLUCOSE          RADIOLOGY & ADDITIONAL TESTS:    Imaging Personally Reviewed:  [x ] YES  [ ] NO    Consultant(s) Notes Reviewed:  [ ] YES  [ ] NO    Care Discussed with Consultants/Other Providers [ ] YES  [ ] NO

## 2018-03-30 PROCEDURE — 99233 SBSQ HOSP IP/OBS HIGH 50: CPT

## 2018-03-30 RX ADMIN — HEPARIN SODIUM 5000 UNIT(S): 5000 INJECTION INTRAVENOUS; SUBCUTANEOUS at 22:54

## 2018-03-30 RX ADMIN — HEPARIN SODIUM 5000 UNIT(S): 5000 INJECTION INTRAVENOUS; SUBCUTANEOUS at 13:01

## 2018-03-30 RX ADMIN — AMLODIPINE BESYLATE 5 MILLIGRAM(S): 2.5 TABLET ORAL at 05:12

## 2018-03-30 RX ADMIN — HEPARIN SODIUM 5000 UNIT(S): 5000 INJECTION INTRAVENOUS; SUBCUTANEOUS at 05:12

## 2018-03-30 NOTE — PROGRESS NOTE ADULT - SUBJECTIVE AND OBJECTIVE BOX
Patient is a 89y old  Male who presents with a chief complaint of weakness, failure to thrive (28 Mar 2018 02:30)      INTERVAL HPI/OVERNIGHT EVENTS: no acute events. tolerating po.     MEDICATIONS  (STANDING):  amLODIPine   Tablet 5 milliGRAM(s) Oral daily  heparin  Injectable 5000 Unit(s) SubCutaneous every 8 hours  sodium chloride 0.9%. 1000 milliLiter(s) (100 mL/Hr) IV Continuous <Continuous>    MEDICATIONS  (PRN):  acetaminophen   Tablet. 650 milliGRAM(s) Oral every 6 hours PRN Mild Pain (1 - 3)  morphine  - Injectable 2 milliGRAM(s) IV Push every 6 hours PRN Severe Pain (7 - 10)  oxyCODONE    5 mG/acetaminophen 325 mG 1 Tablet(s) Oral every 4 hours PRN Moderate Pain (4 - 6)        Allergies    No Known Allergies    Intolerances        REVIEW OF SYSTEMS:  CONSTITUTIONAL: No fever, weight loss, or fatigue  EYES: No eye pain, visual disturbances, or discharge  ENMT:  No difficulty hearing, tinnitus, vertigo; No sinus or throat pain  NECK: No pain or stiffness  RESPIRATORY: No cough, wheezing, chills or hemoptysis; No shortness of breath  CARDIOVASCULAR: No chest pain, palpitations, dizziness, or leg swelling  GASTROINTESTINAL: No abdominal or epigastric pain. No nausea, vomiting, or hematemesis; No diarrhea or constipation. No melena or hematochezia.  GENITOURINARY: No dysuria, frequency, hematuria, or incontinence  NEUROLOGICAL: weakness  SKIN: No itching, burning, rashes, or lesions   LYMPH NODES: No enlarged glands  ENDOCRINE: No heat or cold intolerance; No hair loss  MUSCULOSKELETAL: No joint pain or swelling; No muscle, back, or extremity pain    Vital Signs Last 24 Hrs  T(C): 36.6 (30 Mar 2018 13:09), Max: 36.7 (29 Mar 2018 17:10)  T(F): 97.8 (30 Mar 2018 13:09), Max: 98 (29 Mar 2018 17:10)  HR: 88 (30 Mar 2018 13:09) (88 - 114)  BP: 146/91 (30 Mar 2018 13:09) (136/66 - 154/98)  BP(mean): --  RR: 17 (30 Mar 2018 13:09) (15 - 20)  SpO2: 97% (30 Mar 2018 13:09) (94% - 97%)    PHYSICAL EXAM:  GENERAL: NAD, well-groomed, well-developed  HEAD:  Atraumatic, Normocephalic  EYES: EOMI, PERRLA, conjunctiva and sclera clear  ENMT: No tonsillar erythema, exudates, or enlargement; Moist mucous membranes, Good dentition, No lesions  NECK: Supple, No JVD, Normal thyroid  NERVOUS SYSTEM:  Alert & Oriented X3, Good concentration; Motor Strength 5/5 B/L upper and 4/5 lower extremities; DTRs 2+ intact and symmetric  CHEST/LUNG: Clear to percussion bilaterally; No rales, rhonchi, wheezing, or rubs  HEART: slightly irregular; No murmurs, rubs, or gallops  ABDOMEN: Soft, Nontender, Nondistended; Bowel sounds present  EXTREMITIES:  2+ Peripheral Pulses, No clubbing, cyanosis, or edema  LYMPH: No lymphadenopathy noted  SKIN: No rashes or lesions    LABS:                                       8.3    5.00  )-----------( 440      ( 29 Mar 2018 08:43 )             25.5     03-29    140  |  106  |  22  ----------------------------<  107<H>  3.6   |  27  |  0.64    Ca    8.6      29 Mar 2018 08:43                CAPILLARY BLOOD GLUCOSE          RADIOLOGY & ADDITIONAL TESTS:    Imaging Personally Reviewed:  [x ] YES  [ ] NO    Consultant(s) Notes Reviewed:  [ ] YES  [ ] NO    Care Discussed with Consultants/Other Providers [ ] YES  [ ] NO

## 2018-03-30 NOTE — PROGRESS NOTE ADULT - SUBJECTIVE AND OBJECTIVE BOX
S: Patient denies pain. Denies numbness or other parasthesias in the lower extremities. Denies gross hematuria and all LUTS.         O: Vital Signs Last 24 Hrs  T(C): 36.6 (30 Mar 2018 13:09), Max: 36.7 (29 Mar 2018 17:10)  T(F): 97.8 (30 Mar 2018 13:09), Max: 98 (29 Mar 2018 17:10)  HR: 88 (30 Mar 2018 13:09) (88 - 114)  BP: 146/91 (30 Mar 2018 13:09) (136/66 - 154/98)  RR: 17 (30 Mar 2018 13:09) (15 - 20)  SpO2: 97% (30 Mar 2018 13:09) (94% - 97%)        NAD, A&Ox1   CTAB  Abd soft, NT/ND, No masses  Voided in urinal - urine clear/yellow  Sensation to light touch intact bilat lower extremities. 4/5 motor.  No edema                        8.3    5.00  )-----------( 440      ( 29 Mar 2018 08:43 )             25.5     03-29    140  |  106  |  22  ----------------------------<  107<H>  3.6   |  27  |  0.64    Ca    8.6      29 Mar 2018 08:43

## 2018-03-30 NOTE — PROGRESS NOTE ADULT - ASSESSMENT
89 year old man with PMH metastatic prostate ca presents with failure to thrive.  Had a long discussion with patients son Tyler and we discussed the patients prognosis and the need to tell the patient. He is AAOx3 and is able to make decisions.  The family wishes  for treatment and we discussed that he cannot get therapy without his permission. He wanted me to discuss this with his urologist Dr. Del Toro and his PCP Dr. Martinez, which I did and they both agreed to of the importance of telling the patient and will talk to the family.     NM bones scan shows metastatic dz and rec xrays of humerus and femur to eval the risk of pathological fracture.

## 2018-03-31 PROCEDURE — 73060 X-RAY EXAM OF HUMERUS: CPT | Mod: 26

## 2018-03-31 PROCEDURE — 73552 X-RAY EXAM OF FEMUR 2/>: CPT | Mod: 26,50

## 2018-03-31 PROCEDURE — 99233 SBSQ HOSP IP/OBS HIGH 50: CPT

## 2018-03-31 RX ADMIN — HEPARIN SODIUM 5000 UNIT(S): 5000 INJECTION INTRAVENOUS; SUBCUTANEOUS at 06:30

## 2018-03-31 RX ADMIN — AMLODIPINE BESYLATE 5 MILLIGRAM(S): 2.5 TABLET ORAL at 06:30

## 2018-03-31 RX ADMIN — OXYCODONE AND ACETAMINOPHEN 1 TABLET(S): 5; 325 TABLET ORAL at 22:29

## 2018-03-31 RX ADMIN — HEPARIN SODIUM 5000 UNIT(S): 5000 INJECTION INTRAVENOUS; SUBCUTANEOUS at 22:04

## 2018-03-31 RX ADMIN — HEPARIN SODIUM 5000 UNIT(S): 5000 INJECTION INTRAVENOUS; SUBCUTANEOUS at 13:47

## 2018-03-31 RX ADMIN — OXYCODONE AND ACETAMINOPHEN 1 TABLET(S): 5; 325 TABLET ORAL at 23:29

## 2018-03-31 NOTE — PROGRESS NOTE ADULT - ASSESSMENT
89 year old man with PMH metastatic prostate ca presents with failure to thrive.  Had a long discussion with patients junior Scott and we discussed the patients prognosis and the need to tell the patient. He is AAOx3 and is able to make decisions.  The family wishes  for treatment and we discussed that he cannot get therapy without his permission. He wanted me to discuss this with his urologist Dr. Del Toro and his PCP Dr. Martinez, which I did and they both agreed to of the importance of telling the patient and will talk to the family. I had  discussion with junior Scott and he is now ok with me telling the patient. Will discuss with the patient regarding his condition and treatment options.

## 2018-03-31 NOTE — PROGRESS NOTE ADULT - PROBLEM SELECTOR PROBLEM 1
Prostate cancer metastatic to multiple sites

## 2018-03-31 NOTE — PROGRESS NOTE ADULT - PROBLEM SELECTOR PLAN 1
Recurrent prostate cancer with osseous metastases. Discussed with Dr. Sebastian and patient. Will initiate hormonal ablation with Firmagon given osseous metastases to avoid testosterone flare. Followup PSA on ablation. Will do DEXA and initiate bone therapy.
NM bones scan shows metastatic dz and rec xrays of humerus and femur to eval the risk of pathological fracture. Pain control   Tylenol/Percocet/Morphine PRN  Not on treatment  Palliative care consult  *pt unaware of diagnosis  Dr. Del Toro consulted as this is his office pt.    bone scan appreciated
Pain control   Tylenol/Percocet/Morphine PRN  Not on treatment  Palliative care consult  *pt unaware of diagnosis  Dr. Del Toro consulted as this is his office pt.    bone scan appreciated
Pain control   Tylenol/Percocet/Morphine PRN  Not on treatment  Palliative care consult  *pt unaware of diagnosis  Dr. Del Toro consulted as this is his office pt.   will het bone scan
Evidence of metastatic disease on CT and bone scan this week. Markedly elevated PSA. Evidence of brachytherapy seeds in the prostate.   - Dr. Del Toro discussing with son  that he would benefit from treatment (firmagon possibly) for suppression especially due to concerns for pathologic fractures  -  F/u decision in the AM

## 2018-03-31 NOTE — PROGRESS NOTE ADULT - SUBJECTIVE AND OBJECTIVE BOX
Patient is a 89y old  Male who presents with a chief complaint of weakness, failure to thrive (28 Mar 2018 02:30)      INTERVAL HPI/OVERNIGHT EVENTS: no acute events. tolerating po.     MEDICATIONS  (STANDING):  amLODIPine   Tablet 5 milliGRAM(s) Oral daily  heparin  Injectable 5000 Unit(s) SubCutaneous every 8 hours  sodium chloride 0.9%. 1000 milliLiter(s) (100 mL/Hr) IV Continuous <Continuous>    MEDICATIONS  (PRN):  acetaminophen   Tablet. 650 milliGRAM(s) Oral every 6 hours PRN Mild Pain (1 - 3)  morphine  - Injectable 2 milliGRAM(s) IV Push every 6 hours PRN Severe Pain (7 - 10)  oxyCODONE    5 mG/acetaminophen 325 mG 1 Tablet(s) Oral every 4 hours PRN Moderate Pain (4 - 6)        Allergies    No Known Allergies    Intolerances        REVIEW OF SYSTEMS:  CONSTITUTIONAL: No fever, weight loss, or fatigue  EYES: No eye pain, visual disturbances, or discharge  ENMT:  No difficulty hearing, tinnitus, vertigo; No sinus or throat pain  NECK: No pain or stiffness  RESPIRATORY: No cough, wheezing, chills or hemoptysis; No shortness of breath  CARDIOVASCULAR: No chest pain, palpitations, dizziness, or leg swelling  GASTROINTESTINAL: No abdominal or epigastric pain. No nausea, vomiting, or hematemesis; No diarrhea or constipation. No melena or hematochezia.  GENITOURINARY: No dysuria, frequency, hematuria, or incontinence  NEUROLOGICAL: weakness  SKIN: No itching, burning, rashes, or lesions   LYMPH NODES: No enlarged glands  ENDOCRINE: No heat or cold intolerance; No hair loss  MUSCULOSKELETAL: No joint pain or swelling; No muscle, back, or extremity pain    Vital Signs Last 24 Hrs  T(C): 36.9 (31 Mar 2018 11:09), Max: 37 (31 Mar 2018 04:49)  T(F): 98.5 (31 Mar 2018 11:09), Max: 98.6 (31 Mar 2018 04:49)  HR: 82 (31 Mar 2018 11:09) (82 - 93)  BP: 138/77 (31 Mar 2018 11:09) (138/77 - 161/99)  BP(mean): --  RR: 18 (31 Mar 2018 11:09) (16 - 18)  SpO2: 97% (31 Mar 2018 11:09) (97% - 98%)      PHYSICAL EXAM:  GENERAL: NAD, well-groomed, well-developed  HEAD:  Atraumatic, Normocephalic  EYES: EOMI, PERRLA, conjunctiva and sclera clear  ENMT: No tonsillar erythema, exudates, or enlargement; Moist mucous membranes, Good dentition, No lesions  NECK: Supple, No JVD, Normal thyroid  NERVOUS SYSTEM:  Alert & Oriented X3, Good concentration; Motor Strength 5/5 B/L upper and 4/5 lower extremities; DTRs 2+ intact and symmetric  CHEST/LUNG: Clear to percussion bilaterally; No rales, rhonchi, wheezing, or rubs  HEART: slightly irregular; No murmurs, rubs, or gallops  ABDOMEN: Soft, Nontender, Nondistended; Bowel sounds present  EXTREMITIES:  2+ Peripheral Pulses, No clubbing, cyanosis, or edema  LYMPH: No lymphadenopathy noted  SKIN: No rashes or lesions    LABS:                            CAPILLARY BLOOD GLUCOSE          RADIOLOGY & ADDITIONAL TESTS:    Imaging Personally Reviewed:  [x ] YES  [ ] NO    Consultant(s) Notes Reviewed:  [ ] YES  [ ] NO    Care Discussed with Consultants/Other Providers [ ] YES  [ ] NO

## 2018-03-31 NOTE — PROGRESS NOTE ADULT - SUBJECTIVE AND OBJECTIVE BOX
INTERVAL HPI/OVERNIGHT EVENTS: 89 year [place] male now reports brachytherapy @ [facility] in 9/11. He was admitted with failure to thrive. He now acknowledges prostate cancer. he denies any paresthesias, lower extremity weakness or bone pain.    MEDICATIONS  (STANDING):  amLODIPine   Tablet 5 milliGRAM(s) Oral daily  heparin  Injectable 5000 Unit(s) SubCutaneous every 8 hours  sodium chloride 0.9%. 1000 milliLiter(s) (100 mL/Hr) IV Continuous <Continuous>    MEDICATIONS  (PRN):  acetaminophen   Tablet. 650 milliGRAM(s) Oral every 6 hours PRN Mild Pain (1 - 3)  morphine  - Injectable 2 milliGRAM(s) IV Push every 6 hours PRN Severe Pain (7 - 10)  oxyCODONE    5 mG/acetaminophen 325 mG 1 Tablet(s) Oral every 4 hours PRN Moderate Pain (4 - 6)      Allergies    No Known Allergies    Intolerances        REVIEW OF SYSTEMS:    General: no anorexia noted	    Respiratory and Thorax: no sob  	  Cardiovascular:	no cp    Gastrointestinal:	no change in BM    Genitourinary:	no LUTS    Musculoskeletal:	 no bone pain      Vital Signs Last 24 Hrs  T(C): 36.9 (31 Mar 2018 11:09), Max: 37 (31 Mar 2018 04:49)  T(F): 98.5 (31 Mar 2018 11:09), Max: 98.6 (31 Mar 2018 04:49)  HR: 82 (31 Mar 2018 11:09) (82 - 93)  BP: 138/77 (31 Mar 2018 11:09) (138/77 - 161/99)  BP(mean): --  RR: 18 (31 Mar 2018 11:09) (16 - 18)  SpO2: 97% (31 Mar 2018 11:09) (97% - 98%)    PHYSICAL EXAM:  Abdomen: soft, ND, NT    Genitourinary: no tenderness    Extremities: no edema    Neuro: no focal deficit      RADIOLOGY & ADDITIONAL TESTS:    Bone scan: Diffuse osseous metastases   Abdominal CT: osseous metastases

## 2018-03-31 NOTE — PROGRESS NOTE ADULT - PROBLEM SELECTOR PLAN 2
family meeting is to be held today to discuss pt medical condition. [phone].  Family has been adamant that the patient not find out about the diagnosis.  He is AAO x 3 and ethically should be made aware of his prostate ca especially given that he will likely require palliative care/Hospice care.  Cannot reach family at this time.  I would like the opportunity to discuss his case with them before informing the patient of his diagnosis and encourage them to notify him/allow us to notify him of his diagnosis.  It is ethically sound to inform him and he will logistically need to know if Hospice is involved or why he is given any treatment.
family meeting is was held  to discuss pt medical condition. [phone].  Family has been adamant that the patient not find out about the diagnosis.  they are aware that we request to tell the pt and are awaiting for more family members to discuss.
- Discussed with Dr. Del Toro who is reaching out to the patient's son Tyler regarding patient's knowledge of disease.
family meeting is was held  to discuss pt medical condition. [phone].  Family has been adamant that the patient not find out about the diagnosis.  they are aware that we request to tell the pt and are awaiting for more family members to discuss.

## 2018-03-31 NOTE — PROGRESS NOTE ADULT - PROBLEM SELECTOR PLAN 5
Heparin 5000 units SC q8h for DVT prophylaxis  General precautions.

## 2018-04-01 ENCOUNTER — TRANSCRIPTION ENCOUNTER (OUTPATIENT)
Age: 83
End: 2018-04-01

## 2018-04-01 VITALS
RESPIRATION RATE: 17 BRPM | TEMPERATURE: 99 F | DIASTOLIC BLOOD PRESSURE: 99 MMHG | HEART RATE: 96 BPM | SYSTOLIC BLOOD PRESSURE: 151 MMHG | OXYGEN SATURATION: 100 %

## 2018-04-01 PROCEDURE — 99239 HOSP IP/OBS DSCHRG MGMT >30: CPT

## 2018-04-01 RX ORDER — ACETAMINOPHEN 500 MG
2 TABLET ORAL
Qty: 0 | Refills: 0 | COMMUNITY
Start: 2018-04-01

## 2018-04-01 RX ORDER — AMLODIPINE BESYLATE 2.5 MG/1
10 TABLET ORAL DAILY
Qty: 0 | Refills: 0 | Status: DISCONTINUED | OUTPATIENT
Start: 2018-04-01 | End: 2018-04-01

## 2018-04-01 RX ORDER — AMLODIPINE BESYLATE 2.5 MG/1
10 TABLET ORAL DAILY
Qty: 0 | Refills: 0 | Status: DISCONTINUED | OUTPATIENT
Start: 2018-04-02 | End: 2018-04-01

## 2018-04-01 RX ORDER — AMLODIPINE BESYLATE 2.5 MG/1
1 TABLET ORAL
Qty: 0 | Refills: 0 | COMMUNITY
Start: 2018-04-01

## 2018-04-01 RX ORDER — AMLODIPINE BESYLATE 2.5 MG/1
5 TABLET ORAL ONCE
Qty: 0 | Refills: 0 | Status: COMPLETED | OUTPATIENT
Start: 2018-04-01 | End: 2018-04-01

## 2018-04-01 RX ADMIN — OXYCODONE AND ACETAMINOPHEN 1 TABLET(S): 5; 325 TABLET ORAL at 06:20

## 2018-04-01 RX ADMIN — OXYCODONE AND ACETAMINOPHEN 1 TABLET(S): 5; 325 TABLET ORAL at 05:20

## 2018-04-01 RX ADMIN — AMLODIPINE BESYLATE 5 MILLIGRAM(S): 2.5 TABLET ORAL at 05:20

## 2018-04-01 RX ADMIN — AMLODIPINE BESYLATE 5 MILLIGRAM(S): 2.5 TABLET ORAL at 11:05

## 2018-04-01 RX ADMIN — HEPARIN SODIUM 5000 UNIT(S): 5000 INJECTION INTRAVENOUS; SUBCUTANEOUS at 13:33

## 2018-04-01 RX ADMIN — SODIUM CHLORIDE 100 MILLILITER(S): 9 INJECTION INTRAMUSCULAR; INTRAVENOUS; SUBCUTANEOUS at 05:20

## 2018-04-01 RX ADMIN — HEPARIN SODIUM 5000 UNIT(S): 5000 INJECTION INTRAVENOUS; SUBCUTANEOUS at 05:20

## 2018-04-01 NOTE — DISCHARGE NOTE ADULT - HOSPITAL COURSE
89 year old man with PMH metastatic prostate ca presents with failure to thrive.  The patient is aware of his condition and wants to pursue treatment. Dr. Del Toro is his urologist and will start treatment in rehab. Will initiate hormonal ablation with Firmagon given osseous metastases to avoid testosterone flare.                  Problem/Plan - :  ·  Problem: Prostate cancer metastatic to multiple sites.  Plan: NM bones scan shows metastatic dz   no concern for cord compression at this time. would get MRI spine if has progressive weakness or numbness   at rehab Will initiate hormonal ablation with Firmagon given osseous metastases to avoid testosterone flare. Followup PSA on ablation. Will do DEXA and initiate bone therapy.    Pain control   pt is full code    bone scan appreciated.         Problem/Plan -  ·  Problem: Essential hypertension.  Plan: start amlodipine.

## 2018-04-01 NOTE — DISCHARGE NOTE ADULT - PATIENT PORTAL LINK FT
You can access the [facility] Patient Portal, offered by [facility], by registering with the following website: [email or web address]

## 2018-04-01 NOTE — DISCHARGE NOTE ADULT - CARE PROVIDER_API CALL
Calvin Del Toro), Urology  [address] [place] [address]  Phone: [phone]  Fax: [phone]

## 2018-04-01 NOTE — DISCHARGE NOTE ADULT - PLAN OF CARE
follow up with Dr. Doe Dr. Del Toro is aware pt is at [facility]   will start treatment monitor recently increased amlodipine

## 2018-04-01 NOTE — DISCHARGE NOTE ADULT - MEDICATION SUMMARY - MEDICATIONS TO TAKE
I will START or STAY ON the medications listed below when I get home from the hospital:    acetaminophen 325 mg oral tablet  -- 2 tab(s) by mouth every 6 hours, As needed, Mild Pain (1 - 3)  -- Indication: For Pain     oxyCODONE-acetaminophen 5 mg-325 mg oral tablet  -- 1 tab(s) by mouth every 4 hours, As needed, Moderate Pain (4 - 6)  -- Indication: For Pain     amLODIPine 10 mg oral tablet  -- 1 tab(s) by mouth once a day  -- Indication: For htn

## 2018-04-01 NOTE — DISCHARGE NOTE ADULT - OTHER SIGNIFICANT FINDINGS
< from: NM Bone Imaging Total (03.29.18 @ 16:18) >    FINDINGS: There are multiple foci of increased tracer uptake in the   sternum, bilateral ribs, scapulae, multiple levels of the spine,   bilateral humeri, pelvis and bilateral femurs, prominent in the left   intertrochanteric region. These findings likely correspond to mixed   sclerotic and lytic lesions seen on recent CT. There is physiologic   tracer distribution in the remainder of the visualized skeleton.    The kidneys are faintly visualized.    IMPRESSION: Abnormal bone scan.    Findings compatible with diffuse osseous metastases.    Suggest radiographic evaluation of femoral and humeral lesions to   determine the risk for pathologic fractures.    < end of copied text >    < from: CT Abdomen and Pelvis w/ IV Cont (03.28.18 @ 00:48) >  Suboptimal assessment of the bowel without oral contrast and due to the   relative paucity of intra-abdominal fat.    There are small bilateral pleural effusions. There is right greater than   left lower lobe compressive atelectasis. Heart is enlarged. There is no   pericardial effusion.    The large and small bowel appear normalin caliber without evidence of   obstruction. There is distention of the colon with moderate amount of   fecal material. There is no abnormal bowel wall thickening, free air or   abscess. No significant ascites. There is no pneumatosis. The appendix is   not visualized.    The liver, gallbladder, spleen, pancreas and adrenal glands are grossly   unremarkable. The kidneys enhance symmetrically without hydronephrosis.   Bilateral renal cysts are noted.    The abdominal aorta is normal in caliber. There is mild ectasia of both   common iliac arteries. The mesenteric vessels appear patent. There is no   retroperitoneal, pelvic or inguinal adenopathy. There is no ascites.    The urinary bladder is unremarkable. The prostate gland is enlarged with   radiation seeds noted.    There is diffuse heterogeneous appearance to the osseous structures with   mixed sclerotic and lytic lucencies compatible with osseous metastatic   disease. Mild generalized anasarca    IMPRESSION:   Limited study as above.No gross evidence of bowel obstruction, free air   or bowel wall thickening. Please correlate clinically for constipation.    Osseous metastatic disease,? Prostate primary.    Small bilateral pleural effusions and lower lobe compressive atelectasis.   Mild cardiomegaly.      < end of copied text >

## 2018-04-01 NOTE — DISCHARGE NOTE ADULT - CARE PLAN
Principal Discharge DX:	Prostate cancer metastatic to multiple sites  Goal:	follow up with Dr. Doe  Assessment and plan of treatment:	Dr. Del Toro is aware pt is at [facility]   will start treatment  Secondary Diagnosis:	Essential hypertension  Goal:	monitor  Assessment and plan of treatment:	recently increased amlodipine

## 2018-04-04 DIAGNOSIS — C79.51 SECONDARY MALIGNANT NEOPLASM OF BONE: ICD-10-CM

## 2018-04-04 DIAGNOSIS — R32 UNSPECIFIED URINARY INCONTINENCE: ICD-10-CM

## 2018-04-04 DIAGNOSIS — J90 PLEURAL EFFUSION, NOT ELSEWHERE CLASSIFIED: ICD-10-CM

## 2018-04-04 DIAGNOSIS — I10 ESSENTIAL (PRIMARY) HYPERTENSION: ICD-10-CM

## 2018-04-04 DIAGNOSIS — R53.1 WEAKNESS: ICD-10-CM

## 2018-04-04 DIAGNOSIS — C61 MALIGNANT NEOPLASM OF PROSTATE: ICD-10-CM

## 2018-04-04 DIAGNOSIS — R62.7 ADULT FAILURE TO THRIVE: ICD-10-CM

## 2018-04-04 DIAGNOSIS — Z51.5 ENCOUNTER FOR PALLIATIVE CARE: ICD-10-CM

## 2018-04-04 DIAGNOSIS — R26.2 DIFFICULTY IN WALKING, NOT ELSEWHERE CLASSIFIED: ICD-10-CM

## 2018-04-04 DIAGNOSIS — I51.7 CARDIOMEGALY: ICD-10-CM

## 2018-04-04 DIAGNOSIS — Z65.9 PROBLEM RELATED TO UNSPECIFIED PSYCHOSOCIAL CIRCUMSTANCES: ICD-10-CM

## 2018-04-04 DIAGNOSIS — Z92.3 PERSONAL HISTORY OF IRRADIATION: ICD-10-CM

## 2018-05-05 ENCOUNTER — OUTPATIENT (OUTPATIENT)
Dept: OUTPATIENT SERVICES | Facility: HOSPITAL | Age: 83
LOS: 1 days | Discharge: HOME | End: 2018-05-05
Payer: MEDICARE

## 2018-05-05 DIAGNOSIS — R50.9 FEVER, UNSPECIFIED: ICD-10-CM

## 2018-05-05 PROBLEM — C61 MALIGNANT NEOPLASM OF PROSTATE: Chronic | Status: ACTIVE | Noted: 2018-03-28

## 2018-05-05 PROCEDURE — 71045 X-RAY EXAM CHEST 1 VIEW: CPT | Mod: 26

## 2018-05-09 ENCOUNTER — OUTPATIENT (OUTPATIENT)
Dept: OUTPATIENT SERVICES | Facility: HOSPITAL | Age: 83
LOS: 1 days | Discharge: HOME | End: 2018-05-09
Payer: MEDICARE

## 2018-05-09 DIAGNOSIS — J18.9 PNEUMONIA, UNSPECIFIED ORGANISM: ICD-10-CM

## 2018-05-09 PROCEDURE — 71045 X-RAY EXAM CHEST 1 VIEW: CPT | Mod: 26

## 2019-01-01 NOTE — PHYSICAL THERAPY INITIAL EVALUATION ADULT - TINETTI GAIT TEST, REHAB EVAL
Pt is going to go to store to  good start gentle and try pt on it.   Indication of gait -1   Step Length and height -0   Foot Clearance -0   Step Symmetry -0   Step Continuity -0   Path -1    Trunk -0   Walking Time -0   Total Score - 2/12

## 2022-03-07 NOTE — PATIENT PROFILE ADULT. - PATIENT REPRESENTATIVE PHONE
Outpatient Medications Marked as Taking for the 3/7/22 encounter (Refill) with Niraj Brown MD   Medication Sig Dispense Refill   • amphetamine-dextroamphetamine (ADDERALL XR) 30 MG 24 hr capsule Take 1 capsule by mouth daily. 30 capsule 0        Ok to leave detailed Message: Yes  Informed caller of refill policy- 48-72 hours: Yes  No call back needed unless nurse has questions.     Pharmacy: [facility]    [phone]

## 2022-07-16 NOTE — H&P ADULT - ASSESSMENT
89 year old man with PMH metastatic prostate ca presents with failure to thrive.  Pt will require admission for at least 2 midnights as detailed below:    IMPROVE VTE Individual Risk Assessment          RISK                                                          Points    [  ] Previous VTE                                                3    [  ] Thrombophilia                                             2    [  ] Lower limb paralysis                                    2        (unable to hold up >15 seconds)      [ x ] Current Cancer                                             2         (within 6 months)    [  x] Immobilization > 24 hrs                              1    [  ] ICU/CCU stay > 24 hours                            1    [ x ] Age > 60                                                    1    IMPROVE VTE Score ____4_____ Toxicology

## 2024-04-10 NOTE — DISCHARGE NOTE ADULT - FUNCTIONAL STATUS DATE
01-Apr-2018
Alert-The patient is alert, awake and responds to voice. The patient is oriented to time, place, and person. The triage nurse is able to obtain subjective information.